# Patient Record
Sex: FEMALE | Race: WHITE | NOT HISPANIC OR LATINO | Employment: FULL TIME | URBAN - METROPOLITAN AREA
[De-identification: names, ages, dates, MRNs, and addresses within clinical notes are randomized per-mention and may not be internally consistent; named-entity substitution may affect disease eponyms.]

---

## 2017-12-01 ENCOUNTER — HOSPITAL ENCOUNTER (OUTPATIENT)
Facility: MEDICAL CENTER | Age: 32
End: 2017-12-01
Attending: SPECIALIST | Admitting: SPECIALIST
Payer: COMMERCIAL

## 2017-12-01 VITALS
SYSTOLIC BLOOD PRESSURE: 103 MMHG | BODY MASS INDEX: 21.9 KG/M2 | HEIGHT: 66 IN | OXYGEN SATURATION: 97 % | HEART RATE: 77 BPM | RESPIRATION RATE: 16 BRPM | TEMPERATURE: 97.5 F | WEIGHT: 136.24 LBS | DIASTOLIC BLOOD PRESSURE: 62 MMHG

## 2017-12-01 PROBLEM — Z41.1 ENCOUNTER FOR COSMETIC SURGERY: Status: ACTIVE | Noted: 2017-12-01

## 2017-12-01 LAB
B-HCG FREE SERPL-ACNC: <5 MIU/ML
IHCGL IHCGL: NEGATIVE MIU/ML

## 2017-12-01 PROCEDURE — 160048 HCHG OR STATISTICAL LEVEL 1-5: Performed by: SPECIALIST

## 2017-12-01 PROCEDURE — 160028 HCHG SURGERY MINUTES - 1ST 30 MINS LEVEL 3: Performed by: SPECIALIST

## 2017-12-01 PROCEDURE — 160039 HCHG SURGERY MINUTES - EA ADDL 1 MIN LEVEL 3: Performed by: SPECIALIST

## 2017-12-01 PROCEDURE — 500122 HCHG BOVIE, BLADE: Performed by: SPECIALIST

## 2017-12-01 PROCEDURE — A9270 NON-COVERED ITEM OR SERVICE: HCPCS

## 2017-12-01 PROCEDURE — 160035 HCHG PACU - 1ST 60 MINS PHASE I: Performed by: SPECIALIST

## 2017-12-01 PROCEDURE — 502240 HCHG MISC OR SUPPLY RC 0272: Performed by: SPECIALIST

## 2017-12-01 PROCEDURE — 84702 CHORIONIC GONADOTROPIN TEST: CPT

## 2017-12-01 PROCEDURE — 700102 HCHG RX REV CODE 250 W/ 637 OVERRIDE(OP)

## 2017-12-01 PROCEDURE — 700111 HCHG RX REV CODE 636 W/ 250 OVERRIDE (IP)

## 2017-12-01 PROCEDURE — 700105 HCHG RX REV CODE 258: Performed by: SPECIALIST

## 2017-12-01 PROCEDURE — 700101 HCHG RX REV CODE 250

## 2017-12-01 PROCEDURE — 160002 HCHG RECOVERY MINUTES (STAT): Performed by: SPECIALIST

## 2017-12-01 PROCEDURE — 160009 HCHG ANES TIME/MIN: Performed by: SPECIALIST

## 2017-12-01 PROCEDURE — 160025 RECOVERY II MINUTES (STATS): Performed by: SPECIALIST

## 2017-12-01 PROCEDURE — 502575 HCHG PACK, BREAST: Performed by: SPECIALIST

## 2017-12-01 PROCEDURE — 160036 HCHG PACU - EA ADDL 30 MINS PHASE I: Performed by: SPECIALIST

## 2017-12-01 PROCEDURE — 500562 HCHG FIBERWIRE: Performed by: SPECIALIST

## 2017-12-01 PROCEDURE — 160046 HCHG PACU - 1ST 60 MINS PHASE II: Performed by: SPECIALIST

## 2017-12-01 PROCEDURE — 501838 HCHG SUTURE GENERAL: Performed by: SPECIALIST

## 2017-12-01 DEVICE — IMPLANTABLE DEVICE: Type: IMPLANTABLE DEVICE | Site: BREAST | Status: FUNCTIONAL

## 2017-12-01 RX ORDER — ACETAMINOPHEN 500 MG
TABLET ORAL
Status: COMPLETED
Start: 2017-12-01 | End: 2017-12-01

## 2017-12-01 RX ORDER — OXYCODONE HCL 5 MG/5 ML
SOLUTION, ORAL ORAL
Status: COMPLETED
Start: 2017-12-01 | End: 2017-12-01

## 2017-12-01 RX ORDER — GENTAMICIN SULFATE 40 MG/ML
INJECTION, SOLUTION INTRAMUSCULAR; INTRAVENOUS
Status: DISCONTINUED | OUTPATIENT
Start: 2017-12-01 | End: 2017-12-01 | Stop reason: HOSPADM

## 2017-12-01 RX ORDER — BUPIVACAINE HYDROCHLORIDE AND EPINEPHRINE 2.5; 5 MG/ML; UG/ML
INJECTION, SOLUTION EPIDURAL; INFILTRATION; INTRACAUDAL; PERINEURAL
Status: DISCONTINUED | OUTPATIENT
Start: 2017-12-01 | End: 2017-12-01 | Stop reason: HOSPADM

## 2017-12-01 RX ORDER — PROMETHAZINE HYDROCHLORIDE 25 MG/1
TABLET ORAL
Status: COMPLETED
Start: 2017-12-01 | End: 2017-12-01

## 2017-12-01 RX ORDER — GABAPENTIN 300 MG/1
CAPSULE ORAL
Status: COMPLETED
Start: 2017-12-01 | End: 2017-12-01

## 2017-12-01 RX ORDER — OXYCODONE HCL 10 MG/1
TABLET, FILM COATED, EXTENDED RELEASE ORAL
Status: COMPLETED
Start: 2017-12-01 | End: 2017-12-01

## 2017-12-01 RX ORDER — SCOLOPAMINE TRANSDERMAL SYSTEM 1 MG/1
PATCH, EXTENDED RELEASE TRANSDERMAL
Status: COMPLETED
Start: 2017-12-01 | End: 2017-12-01

## 2017-12-01 RX ORDER — BACITRACIN ZINC 500 [USP'U]/G
OINTMENT TOPICAL
Status: DISCONTINUED | OUTPATIENT
Start: 2017-12-01 | End: 2017-12-01 | Stop reason: HOSPADM

## 2017-12-01 RX ORDER — LIDOCAINE HYDROCHLORIDE 10 MG/ML
INJECTION, SOLUTION INFILTRATION; PERINEURAL
Status: COMPLETED
Start: 2017-12-01 | End: 2017-12-01

## 2017-12-01 RX ORDER — BACITRACIN 50000 [IU]/1
INJECTION, POWDER, FOR SOLUTION INTRAMUSCULAR
Status: DISCONTINUED | OUTPATIENT
Start: 2017-12-01 | End: 2017-12-01 | Stop reason: HOSPADM

## 2017-12-01 RX ORDER — SODIUM CHLORIDE, SODIUM LACTATE, POTASSIUM CHLORIDE, CALCIUM CHLORIDE 600; 310; 30; 20 MG/100ML; MG/100ML; MG/100ML; MG/100ML
INJECTION, SOLUTION INTRAVENOUS
Status: DISCONTINUED | OUTPATIENT
Start: 2017-12-01 | End: 2017-12-01 | Stop reason: HOSPADM

## 2017-12-01 RX ORDER — CEFAZOLIN SODIUM 1 G/3ML
INJECTION, POWDER, FOR SOLUTION INTRAMUSCULAR; INTRAVENOUS
Status: DISCONTINUED | OUTPATIENT
Start: 2017-12-01 | End: 2017-12-01 | Stop reason: HOSPADM

## 2017-12-01 RX ORDER — MIDAZOLAM HYDROCHLORIDE 1 MG/ML
INJECTION INTRAMUSCULAR; INTRAVENOUS
Status: DISCONTINUED
Start: 2017-12-01 | End: 2017-12-01 | Stop reason: HOSPADM

## 2017-12-01 RX ADMIN — ACETAMINOPHEN 1000 MG: 500 TABLET, COATED ORAL at 12:36

## 2017-12-01 RX ADMIN — LIDOCAINE HYDROCHLORIDE: 10 INJECTION, SOLUTION INFILTRATION; PERINEURAL at 11:20

## 2017-12-01 RX ADMIN — SCOPALAMINE 1 PATCH: 1 PATCH, EXTENDED RELEASE TRANSDERMAL at 12:30

## 2017-12-01 RX ADMIN — OXYCODONE HYDROCHLORIDE 10 MG: 10 TABLET, FILM COATED, EXTENDED RELEASE ORAL at 12:36

## 2017-12-01 RX ADMIN — OXYCODONE HYDROCHLORIDE 5 MG: 5 SOLUTION ORAL at 17:49

## 2017-12-01 RX ADMIN — FENTANYL CITRATE 25 MCG: 50 INJECTION, SOLUTION INTRAMUSCULAR; INTRAVENOUS at 17:30

## 2017-12-01 RX ADMIN — PROMETHAZINE HYDROCHLORIDE 25 MG: 25 TABLET ORAL at 12:36

## 2017-12-01 RX ADMIN — SODIUM CHLORIDE, POTASSIUM CHLORIDE, SODIUM LACTATE AND CALCIUM CHLORIDE: 600; 310; 30; 20 INJECTION, SOLUTION INTRAVENOUS at 11:20

## 2017-12-01 RX ADMIN — FENTANYL CITRATE 25 MCG: 50 INJECTION, SOLUTION INTRAMUSCULAR; INTRAVENOUS at 17:49

## 2017-12-01 RX ADMIN — SODIUM CHLORIDE, POTASSIUM CHLORIDE, SODIUM LACTATE AND CALCIUM CHLORIDE: 600; 310; 30; 20 INJECTION, SOLUTION INTRAVENOUS at 17:05

## 2017-12-01 RX ADMIN — GABAPENTIN 300 MG: 300 CAPSULE ORAL at 12:36

## 2017-12-01 ASSESSMENT — PAIN SCALES - GENERAL
PAINLEVEL_OUTOF10: ASSUMED PAIN PRESENT
PAINLEVEL_OUTOF10: 7
PAINLEVEL_OUTOF10: 4
PAINLEVEL_OUTOF10: 4
PAINLEVEL_OUTOF10: ASSUMED PAIN PRESENT
PAINLEVEL_OUTOF10: 4
PAINLEVEL_OUTOF10: 4
PAINLEVEL_OUTOF10: ASSUMED PAIN PRESENT
PAINLEVEL_OUTOF10: 7
PAINLEVEL_OUTOF10: ASSUMED PAIN PRESENT
PAINLEVEL_OUTOF10: 5
PAINLEVEL_OUTOF10: 4
PAINLEVEL_OUTOF10: 0

## 2017-12-01 NOTE — OR NURSING
1029 PT TO PRE OP TO ASSUME CARE.  1132 Patient allergies and NPO status verified, home medication reconciliation completed and belongings secured. Patient verbalizes understanding of pain scale, expected course of stay and plan of care. Surgical site verified with patient. IV access established. Sequentials placed at bedside

## 2017-12-02 NOTE — OR NURSING
1705: To PACU from OR via gurney, sleeping, respirations spontaneous and non-labored via OPA. CDI bilateral breast surgical dressings.  1715: Still sleeping, respirations spontaneous and non-labored via OPA.   1723: Pt awake, able to open mouth and OPA removed, able to cough on request.  1730: Pain medication given per MAR, no nausea. Tolerating room air.  1745: More awake, asking a lot of questions. SO updated. Pain is tolerable, no nausea.  1800: Pain still not tolerable, pain medication given per MAR, no nausea, still tolerating room air.  1815: Pain is still tolerable, no nausea. Tolerating room air. Call placed to Dr Luis Manuel Casas for discharge order.   1820: Dr Casas returned call and gave orders.  1830: Pain is tolerable, no nausea, sleepy, but arouses to voice. Tolerating room air.  1945: Pain is still tolerable, no nausea. Meets criteria to transfer to Stage 2. Pt dressed and readied for transfer to Stage 2, will follow her into the discharge area.

## 2017-12-02 NOTE — OP REPORT
DATE OF SERVICE:  12/01/2017    PREOPERATIVE DIAGNOSES:    1.  Bilateral ptosis.  2.  Previous breast implants, now with bottoming out on the right side.  3.  Desire for larger implants.  4.  Asymmetry.    POSTOPERATIVE DIAGNOSES:  1.  Bilateral ptosis.  2.  Previous breast implants, now with bottoming out on the right side.  3.  Desire for larger implants.  4.  Asymmetry.    OPERATIVE PROCEDURE:  1.  Bilateral Benelli mastopexy.  2.  Bilateral internal lateral capsulorrhaphy.  3.  Bilateral medial superior capsulotomy.  4.  Placement of Allergan SRF gel implants 695 mL bilateral.    SURGEON:  Luis Manuel Casas MD    ANESTHESIOLOGIST:  Alfredo Webb MD.    ANESTHESIA:  General endotracheal tube.    COMPLICATIONS:  None.    ESTIMATED BLOOD LOSS:  100 mL    INDICATIONS:  The patient is a 32-year-old female who desires to switch out   her old implants.  She has had children since her implants and has ptosis   bilaterally.  Most of the ptosis is considered pseudoptosis but she has   bottoming out of the right implant and the patient would also like larger   implants to fill up the redundant skin.  Risk and benefits of the procedure   have been explained in full including asymmetries, nerve damage, scar   formation, need for further surgery, hematoma, seroma.  The patient   understands the risks and wished to proceed.    FINDINGS:  As above.    DESCRIPTION OF PROCEDURE:  The patient was preoperatively marked in the   holding room in standing position.  She was taken to operating theater where   general anesthesia was induced.  Ancef 2 g was given prior to starting the   procedure.  Initially, we started by doing the Benelli mastopexy, I used a   38-mm template around the nipple areolar complex.  I had already pre-drawn the   Benelli mastopexy and essentially I tried to cheat upwards on the right   nipple areolar complex, because it was significantly lower than the left, so   once we had drawn our areolar marks, I  deepithelialized the Benelli mastopexy   and then brought the outer ring down to the edge of the areola with 3-0 Vicryl   sutures in a clockwise fashion.  The 2-0 FiberWire was then sutured around   the outer ring in a pursestring fashion and pulled tightly to match the outer   ring to the edge of the areola.  Then, finally suture line of 3-0 Stratafix   was done in a subcuticular stitch to close the skin itself.  Both sides had a   very nice closure.  I was pleased with the Benelli mastopexy.  There was still   some height discrepancy between the right and left nipple areolar complexes.    I was hoping that I could make this up with the implants and the pocket   adjustments, because the right pocket had bottomed out so far, the new   implants and tightening the lateral and inferior pockets may even the height   of the nipple areolar complexes out and that is what I had hoped for.  Once I   had finished the Benelli mastopexy, I opened up the inframammary area on the   right breast, removed the old saline implant, which was 460 mL.  I then washed   the pocket with normal saline with triple antibiotic added, the lateral   pocket was obviously too far.  Scored it with the electrocautery.  Used a 2-0   Ethibond suture to retighten the lateral pocket, and because she had bottomed   out inferiorly also on this right side, I tightened up the lateral half of the   inferior portion of the pocket.  I then released the medial and superior   capsular tissue to allow the new implant better positioning.  I placed a   saline sizer, filled it to 695 mL and sat the patient up.  This definitely   helped fill up some of the skin that was remaining after the Benelli   mastopexies, but I was not totally satisfied with the shape.  I therefore had   my office bring over the SRF gel implants for a more high profile implant in   order to fit in the pocket better and elevate the nipple as much as possible.    Went over the left side, removed  the implant, which again was a 460 mL saline   implant, tightened the lateral pocket, opened up the medial and superior   pockets with electrocautery, and at this point, I chose the Allergan SRF gel   implants.  They were placed in the submuscular position through a Hester   funnel.  Patient was sat up and pocket adjustments were made to make sure that   the implant sat in the pocket evenly.  There were still some discrepancy   between the height of the nipple areolar complexes.  I released the capsular   tissue inferiorly on the right side, which helped bring the nipple up slightly   higher, tightened the pocket on the left side to lower the nipple areolar   complex on that side and both of those maneuvers helped, but I still was not   satisfied with the right nipple placement, so I took down the Benelli   mastopexy on the right side, cut out another centimeter elliptical incision   superiorly, redid the stitching with 3-0 Vicryl sutures and a 2-0 FiberWire   and then the 3-0 Stratafix.  This gave us as close as we could expect in   regards to the height of the nipples without too much breast distortion.  Once   I had closed that part, patient was sat up one final time.  The pockets were   irrigated, adjustments were made.  When we were satisfied, she was laid back   down, closed the inferior incisions with 3-0 Vicryl sutures in a multilayered   fashion and a 3-0 Stratafix suture in a subcuticular stitch.  Steri-Strips   were applied, 2-inch foam tape was placed around the breast and she was placed   in a comfort supportive bra.  I wrapped her breasts with a Shur-Band Ace wrap   for compression.  She was returned to the PACU in stable condition.       ____________________________________     MD BETZAIDA ARIZMENDI / BIBI    DD:  12/01/2017 17:42:55  DT:  12/02/2017 01:18:37    D#:  [id number]  Job#:  [id number]

## 2017-12-02 NOTE — OR NURSING
1846: Settled in recliner post short ambulation from [facility], tolerated it well. Pain is tolerable, no nausea. Dressing is still CDI.   1855: Boyfriend, Shahab, brought back to go over DC instruction.   1920: D/Uriel to care of family post uneventful stay in PACU 2.

## 2017-12-02 NOTE — DISCHARGE INSTRUCTIONS
ACTIVITY: Rest and take it easy for the first 24 hours.  A responsible adult is recommended to remain with you during that time.  It is normal to feel sleepy.  We encourage you to not do anything that requires balance, judgment or coordination.    MILD FLU-LIKE SYMPTOMS ARE NORMAL. YOU MAY EXPERIENCE GENERALIZED MUSCLE ACHES, THROAT IRRITATION, HEADACHE AND/OR SOME NAUSEA.    FOR 24 HOURS DO NOT:  Drive, operate machinery or run household appliances.  Drink beer or alcoholic beverages.   Make important decisions or sign legal documents.    SPECIAL INSTRUCTIONS:   Elevate head of bed 45 degrees x 48 hours (prop up on several pillows when resting or sleeping.   Leave ACE wrap on for 48 hours.   Keep dressing clean, dry and intact.   Leave steri-strips in place.   May shower in 48 hours, but leave foam tape on across cleavage for 5 days.   Wear surgical bra continually, may remove for shower only, until notified by MD.   Remove scopolamine patch after 72 hours; immediately wash skin then hands with soap and water.     Start antibiotics tomorrow (Saturday).      DIET: To avoid nausea, slowly advance diet as tolerated, avoiding spicy or greasy foods for the first day.  Add more substantial food to your diet according to your physician's instructions.  INCREASE FLUIDS AND FIBER TO AVOID CONSTIPATION.    SURGICAL DRESSING/BATHING: Keep dressing clean, dry and intact x 48 hours.     FOLLOW-UP APPOINTMENT:  A follow-up appointment should be arranged with your doctor in office as instructed. ; call to schedule.    You should CALL YOUR PHYSICIAN if you develop:  Fever greater than 101 degrees F.  Pain not relieved by medication, or persistent nausea or vomiting.  Excessive bleeding (blood soaking through dressing) or unexpected drainage from the wound.  Extreme redness or swelling around the incision site, drainage of pus or foul smelling drainage.  Inability to urinate or empty your bladder within 8 hours.  Problems with  breathing or chest pain.    You should call 911 if you develop problems with breathing or chest pain.  If you are unable to contact your doctor or surgical center, you should go to the nearest emergency room or urgent care center.  Dr Casas's telephone #: [phone]    If any questions arise, call your doctor.  If your doctor is not available, please feel free to call the Surgical Center at [phone].  The Center is open Monday through Friday from 7AM to 7PM.  You can also call the HEALTH HOTLINE open 24 hours/day, 7 days/week and speak to a nurse at [phone], or toll free at [phone].    A registered nurse may call you a few days after your surgery to see how you are doing after your procedure.    MEDICATIONS: Resume taking daily medication.  Take prescribed pain medication with food.  If no medication is prescribed, you may take non-aspirin pain medication if needed.  PAIN MEDICATION CAN BE VERY CONSTIPATING.  Take a stool softener or laxative such as senokot, pericolace, or milk of magnesia if needed.    Prescription given prior to surgery.  Last pain medication given at 5:49 PM.    If your physician has prescribed pain medication that includes Acetaminophen (Tylenol), do not take additional Acetaminophen (Tylenol) while taking the prescribed medication.    Depression / Suicide Risk    As you are discharged from this [facility] facility, it is important to learn how to keep safe from harming yourself.    Recognize the warning signs:  · Abrupt changes in personality, positive or negative- including increase in energy   · Giving away possessions  · Change in eating patterns- significant weight changes-  positive or negative  · Change in sleeping patterns- unable to sleep or sleeping all the time   · Unwillingness or inability to communicate  · Depression  · Unusual sadness, discouragement and loneliness  · Talk of wanting to die  · Neglect of personal appearance   · Rebelliousness- reckless  behavior  · Withdrawal from people/activities they love  · Confusion- inability to concentrate     If you or a loved one observes any of these behaviors or has concerns about self-harm, here's what you can do:  · Talk about it- your feelings and reasons for harming yourself  · Remove any means that you might use to hurt yourself (examples: pills, rope, extension cords, firearm)  · Get professional help from the community (Mental Health, Substance Abuse, psychological counseling)  · Do not be alone:Call your Safe Contact- someone whom you trust who will be there for you.  · Call your local CRISIS HOTLINE 753-3082 or 417-298-2800  · Call your local Children's Mobile Crisis Response Team [place] (498) 061-3077 or www.[employer]  · Call the toll free National Suicide Prevention Hotlines   · National Suicide Prevention Lifeline 165-431-IKWW (4148)  · National Hope Line Network 800-SUICIDE (397-6638)

## 2017-12-02 NOTE — OR SURGEON
Immediate Post OP Note    PreOp Diagnosis: desirer for larger implants, ptosis    PostOp Diagnosis: same    Procedure(s):  PLACEMENT BILATERAL BREAST IMPLANTS - Wound Class: Clean  BREAST IMPLANT REMOVAL - Wound Class: Clean  CAPSULECTOMY - Wound Class: Clean  MASTOPEXY- BENELLI - Wound Class: Clean    Surgeon(s):  Luis Manuel Casas M.D.    Anesthesiologist/Type of Anesthesia:  Anesthesiologist: Alfredo Webb M.D./General    Surgical Staff:  Circulator: Jyotsna Begum R.N.  Scrub Person: Tere Traore    Specimens:  * No specimens in log *    Estimated Blood Loss: 75cc    Findings:     Complications: none        12/1/2017 5:20 PM Luis Manuel Casas

## 2017-12-13 NOTE — OR NURSING
Reviewed patient's medical history with Dr. Feliz, based upon information available, Dr Feliz indicates that the patient is a candidate to have the procedure at [facility] with further recommendations

## 2017-12-14 ENCOUNTER — HOSPITAL ENCOUNTER (OUTPATIENT)
Facility: MEDICAL CENTER | Age: 32
End: 2017-12-14
Attending: SPECIALIST | Admitting: SPECIALIST
Payer: COMMERCIAL

## 2017-12-14 VITALS
OXYGEN SATURATION: 95 % | RESPIRATION RATE: 12 BRPM | WEIGHT: 141.31 LBS | BODY MASS INDEX: 22.81 KG/M2 | TEMPERATURE: 98.2 F | SYSTOLIC BLOOD PRESSURE: 111 MMHG | HEART RATE: 84 BPM | DIASTOLIC BLOOD PRESSURE: 63 MMHG

## 2017-12-14 LAB
B-HCG FREE SERPL-ACNC: <5 MIU/ML
IHCGL IHCGL: NEGATIVE MIU/ML

## 2017-12-14 PROCEDURE — 160046 HCHG PACU - 1ST 60 MINS PHASE II: Performed by: SPECIALIST

## 2017-12-14 PROCEDURE — 160048 HCHG OR STATISTICAL LEVEL 1-5: Performed by: SPECIALIST

## 2017-12-14 PROCEDURE — A9270 NON-COVERED ITEM OR SERVICE: HCPCS

## 2017-12-14 PROCEDURE — 160028 HCHG SURGERY MINUTES - 1ST 30 MINS LEVEL 3: Performed by: SPECIALIST

## 2017-12-14 PROCEDURE — 160002 HCHG RECOVERY MINUTES (STAT): Performed by: SPECIALIST

## 2017-12-14 PROCEDURE — 700111 HCHG RX REV CODE 636 W/ 250 OVERRIDE (IP)

## 2017-12-14 PROCEDURE — 302699 HCHG ABDOMINAL BINDER: Performed by: SPECIALIST

## 2017-12-14 PROCEDURE — 500423 HCHG DRESSING, ABD COMBINE: Performed by: SPECIALIST

## 2017-12-14 PROCEDURE — 160009 HCHG ANES TIME/MIN: Performed by: SPECIALIST

## 2017-12-14 PROCEDURE — 700101 HCHG RX REV CODE 250

## 2017-12-14 PROCEDURE — 500378 HCHG DRAIN, J-VAC ROUND 19FR: Performed by: SPECIALIST

## 2017-12-14 PROCEDURE — 160035 HCHG PACU - 1ST 60 MINS PHASE I: Performed by: SPECIALIST

## 2017-12-14 PROCEDURE — 700102 HCHG RX REV CODE 250 W/ 637 OVERRIDE(OP)

## 2017-12-14 PROCEDURE — 160036 HCHG PACU - EA ADDL 30 MINS PHASE I: Performed by: SPECIALIST

## 2017-12-14 PROCEDURE — 84702 CHORIONIC GONADOTROPIN TEST: CPT

## 2017-12-14 PROCEDURE — 500389 HCHG DRAIN, RESERVOIR SUCT JP 100CC: Performed by: SPECIALIST

## 2017-12-14 PROCEDURE — 502575 HCHG PACK, BREAST: Performed by: SPECIALIST

## 2017-12-14 PROCEDURE — A6223 GAUZE >16<=48 NO W/SAL W/O B: HCPCS | Performed by: SPECIALIST

## 2017-12-14 PROCEDURE — 501445 HCHG STAPLER, SKIN DISP: Performed by: SPECIALIST

## 2017-12-14 PROCEDURE — 501838 HCHG SUTURE GENERAL: Performed by: SPECIALIST

## 2017-12-14 PROCEDURE — A6402 STERILE GAUZE <= 16 SQ IN: HCPCS | Performed by: SPECIALIST

## 2017-12-14 PROCEDURE — 160039 HCHG SURGERY MINUTES - EA ADDL 1 MIN LEVEL 3: Performed by: SPECIALIST

## 2017-12-14 PROCEDURE — 160025 RECOVERY II MINUTES (STATS): Performed by: SPECIALIST

## 2017-12-14 PROCEDURE — 500064 HCHG BINDER, 4-PANEL MED/LG: Performed by: SPECIALIST

## 2017-12-14 RX ORDER — BUPIVACAINE HYDROCHLORIDE 2.5 MG/ML
INJECTION, SOLUTION EPIDURAL; INFILTRATION; INTRACAUDAL
Status: DISCONTINUED | OUTPATIENT
Start: 2017-12-14 | End: 2017-12-14 | Stop reason: HOSPADM

## 2017-12-14 RX ORDER — ACETAMINOPHEN 500 MG
TABLET ORAL
Status: COMPLETED
Start: 2017-12-14 | End: 2017-12-14

## 2017-12-14 RX ORDER — OXYCODONE HCL 5 MG/5 ML
SOLUTION, ORAL ORAL
Status: COMPLETED
Start: 2017-12-14 | End: 2017-12-14

## 2017-12-14 RX ORDER — OXYCODONE HCL 20 MG/1
TABLET, FILM COATED, EXTENDED RELEASE ORAL
Status: COMPLETED
Start: 2017-12-14 | End: 2017-12-14

## 2017-12-14 RX ORDER — SODIUM CHLORIDE, SODIUM LACTATE, POTASSIUM CHLORIDE, CALCIUM CHLORIDE 600; 310; 30; 20 MG/100ML; MG/100ML; MG/100ML; MG/100ML
1000 INJECTION, SOLUTION INTRAVENOUS
Status: COMPLETED | OUTPATIENT
Start: 2017-12-14 | End: 2017-12-14

## 2017-12-14 RX ORDER — PROMETHAZINE HYDROCHLORIDE 25 MG/1
TABLET ORAL
Status: COMPLETED
Start: 2017-12-14 | End: 2017-12-14

## 2017-12-14 RX ORDER — GABAPENTIN 300 MG/1
CAPSULE ORAL
Status: COMPLETED
Start: 2017-12-14 | End: 2017-12-14

## 2017-12-14 RX ORDER — MIDAZOLAM HYDROCHLORIDE 1 MG/ML
INJECTION INTRAMUSCULAR; INTRAVENOUS
Status: DISCONTINUED
Start: 2017-12-14 | End: 2017-12-14 | Stop reason: HOSPADM

## 2017-12-14 RX ORDER — BUPIVACAINE HYDROCHLORIDE AND EPINEPHRINE 2.5; 5 MG/ML; UG/ML
INJECTION, SOLUTION EPIDURAL; INFILTRATION; INTRACAUDAL; PERINEURAL
Status: DISCONTINUED | OUTPATIENT
Start: 2017-12-14 | End: 2017-12-14 | Stop reason: HOSPADM

## 2017-12-14 RX ORDER — SCOLOPAMINE TRANSDERMAL SYSTEM 1 MG/1
PATCH, EXTENDED RELEASE TRANSDERMAL
Status: COMPLETED
Start: 2017-12-14 | End: 2017-12-14

## 2017-12-14 RX ADMIN — GABAPENTIN 600 MG: 300 CAPSULE ORAL at 11:45

## 2017-12-14 RX ADMIN — FENTANYL CITRATE 25 MCG: 50 INJECTION, SOLUTION INTRAMUSCULAR; INTRAVENOUS at 15:55

## 2017-12-14 RX ADMIN — SODIUM CHLORIDE, SODIUM LACTATE, POTASSIUM CHLORIDE, CALCIUM CHLORIDE 1000 ML: 600; 310; 30; 20 INJECTION, SOLUTION INTRAVENOUS at 10:52

## 2017-12-14 RX ADMIN — FENTANYL CITRATE 25 MCG: 50 INJECTION, SOLUTION INTRAMUSCULAR; INTRAVENOUS at 15:45

## 2017-12-14 RX ADMIN — OXYCODONE HYDROCHLORIDE 20 MG: 20 TABLET, FILM COATED, EXTENDED RELEASE ORAL at 11:45

## 2017-12-14 RX ADMIN — PROMETHAZINE HYDROCHLORIDE 25 MG: 25 TABLET ORAL at 11:45

## 2017-12-14 RX ADMIN — ACETAMINOPHEN 1000 MG: 500 TABLET, COATED ORAL at 11:45

## 2017-12-14 RX ADMIN — OXYCODONE HYDROCHLORIDE 10 MG: 5 SOLUTION ORAL at 15:35

## 2017-12-14 RX ADMIN — SCOPALAMINE 1 PATCH: 1 PATCH, EXTENDED RELEASE TRANSDERMAL at 11:45

## 2017-12-14 RX ADMIN — FENTANYL CITRATE 50 MCG: 50 INJECTION, SOLUTION INTRAMUSCULAR; INTRAVENOUS at 15:40

## 2017-12-14 ASSESSMENT — PAIN SCALES - GENERAL
PAINLEVEL_OUTOF10: 8

## 2017-12-14 NOTE — OR NURSING
1515 To PACU from OR via gurney, respirations spontaneous and non-labored. Dressing to abdomen c/d/i. CORINA present from under right side of dressing. Pain pump present from under left side of dressing.   1526 OPA dc'd at this time. VSS. Pt drowsy but responds to verbal stimuli.   1535 Pt more awake at this time. C/O 8/10 pain. Plan to medicate. See MAR   1555 Pt increasingly sleepy. Do not plan to further medicate at this time. VSS.   1610 Pt sleeping. VSS   1625 No change   1640 Pt remains sleepy. VSS.   1655 No change  1700 Pt more awake at this time. Tolerating sips of water. VSS.   1715 No change  1730 Pt meets criteria to transfer to stage two at this time. Pt states pain is tolerable. Pt denies nausea   1740 This RN to discharge pt from stage two   1745 Pt up to chair with help of RN. VSS   1815 Explained dc instructions to pt and pts SO. Both express understanding.   1820 Pt to bathroom at this time. Pt able to void.   1825 Pt meets criteria to be discharge home after stay in stage two.

## 2017-12-14 NOTE — OR SURGEON
Immediate Post OP Note    PreOp Diagnosis: abdominal laxity    PostOp Diagnosis: same    Procedure(s):  ABDOMINOPLASTY - Wound Class: Clean    Surgeon(s):  Luis Manuel Casas M.D.    Anesthesiologist/Type of Anesthesia:  Anesthesiologist: Alfredo Webb M.D./General    Surgical Staff:  Circulator: Laurel Ortiz R.N.  Relief Circulator: Rosa Pollack R.N.  Scrub Person: Tere Traore    Specimens:  * No specimens in log *    Estimated Blood Loss: 100cc    Findings:     Complications: none        12/14/2017 3:20 PM Luis Manuel Casas

## 2017-12-15 NOTE — DISCHARGE INSTRUCTIONS
ACTIVITY: Rest and take it easy for the first 24 hours.  A responsible adult is recommended to remain with you during that time.  It is normal to feel sleepy.  We encourage you to not do anything that requires balance, judgment or coordination.    MILD FLU-LIKE SYMPTOMS ARE NORMAL. YOU MAY EXPERIENCE GENERALIZED MUSCLE ACHES, THROAT IRRITATION, HEADACHE AND/OR SOME NAUSEA.    FOR 24 HOURS DO NOT:  Drive, operate machinery or run household appliances.  Drink beer or alcoholic beverages.   Make important decisions or sign legal documents.    SPECIAL INSTRUCTIONS: Remove Scopolamine patch from behind your ear in 72 hours - wash hands thoroughly immediately afterward and avoid touching your eyes after touching the medication patch when removing.  Sleep/rest with head elevated at least 30 degrees (ie well propped up with pillows in bed or in recliner chair)  May not shower with drains present. Begin taking antibiotic tomorrow.       DIET: To avoid nausea, slowly advance diet as tolerated, avoiding spicy or greasy foods for the first day.  Add more substantial food to your diet according to your physician's instructions. INCREASE FLUIDS AND FIBER TO AVOID CONSTIPATION.        FOLLOW-UP APPOINTMENT:  A follow-up appointment should be arranged with your doctor; call to schedule.    You should CALL YOUR PHYSICIAN if you develop:  Fever greater than 101 degrees F.  Pain not relieved by medication, or persistent nausea or vomiting.  Excessive bleeding (blood soaking through dressing) or unexpected drainage from the wound.  Extreme redness or swelling around the incision site, drainage of pus or foul smelling drainage.  Inability to urinate or empty your bladder within 8 hours.  Problems with breathing or chest pain.    You should call 911 if you develop problems with breathing or chest pain.  If you are unable to contact your doctor or surgical center, you should go to the nearest emergency room or urgent care center.    Augusto's telephone #: [phone]    If any questions arise, call your doctor.  If your doctor is not available, please feel free to call the Surgical Center at [phone].  The Center is open Monday through Friday from 7AM to 7PM.  You can also call the HEALTH HOTLINE open 24 hours/day, 7 days/week and speak to a nurse at [phone], or toll free at [phone].    A registered nurse may call you a few days after your surgery to see how you are doing after your procedure.    MEDICATIONS: Resume taking daily medication.  Take prescribed pain medication with food.  If no medication is prescribed, you may take non-aspirin pain medication if needed.  PAIN MEDICATION CAN BE VERY CONSTIPATING.  Take a stool softener or laxative such as senokot, pericolace, or milk of magnesia if needed.    Prescription given for Preoperatively .  Last pain medication given at 3:35 10 mg oxycodone .    If your physician has prescribed pain medication that includes Acetaminophen (Tylenol), do not take additional Acetaminophen (Tylenol) while taking the prescribed medication.    Depression / Suicide Risk    As you are discharged from this [facility] facility, it is important to learn how to keep safe from harming yourself.    Recognize the warning signs:  · Abrupt changes in personality, positive or negative- including increase in energy   · Giving away possessions  · Change in eating patterns- significant weight changes-  positive or negative  · Change in sleeping patterns- unable to sleep or sleeping all the time   · Unwillingness or inability to communicate  · Depression  · Unusual sadness, discouragement and loneliness  · Talk of wanting to die  · Neglect of personal appearance   · Rebelliousness- reckless behavior  · Withdrawal from people/activities they love  · Confusion- inability to concentrate     If you or a loved one observes any of these behaviors or has concerns about self-harm, here's what you can do:  · Talk about  it- your feelings and reasons for harming yourself  · Remove any means that you might use to hurt yourself (examples: pills, rope, extension cords, firearm)  · Get professional help from the community (Mental Health, Substance Abuse, psychological counseling)  · Do not be alone:Call your Safe Contact- someone whom you trust who will be there for you.  · Call your local CRISIS HOTLINE 725-6768 or 691-260-4944  · Call your local Children's Mobile Crisis Response Team [place] (518) 401-5427 or www.[employer]  · Call the toll free National Suicide Prevention Hotlines   · National Suicide Prevention Lifeline 330-465-YEED (4408)  · National Hope Line Network 800-SUICIDE (859-6637)

## 2017-12-19 NOTE — OP REPORT
DATE OF SERVICE:  12/14/2017    PREOPERATIVE DIAGNOSIS:  Abdominal laxity and abdominal striae.    POSTOPERATIVE DIAGNOSIS:  Abdominal laxity and abdominal striae.    OPERATIVE PROCEDURE:  Abdominoplasty.    SURGEON:  Luis Manuel Casas MD    ANESTHESIOLOGIST:  Alfredo Webb MD    ANESTHESIA:  General endotracheal tube.    COMPLICATIONS:  None.    ESTIMATED BLOOD LOSS:  Less than 150 mL.    INDICATIONS:  The patient is a 32-year-old female who desires an   abdominoplasty.  She has had 4 children.  She is actually very small, but has   abdominal striae and rectus diastasis.  The patient is here for   abdominoplasty.  Risks and benefits have been explained to the patient.  She   understands and wished to proceed.    FINDINGS:  As above.    DESCRIPTION OF PROCEDURE:  The patient was preoperatively marked in the   holding room in standing position.  She was taken to operating theater where   general anesthesia was induced.  2 g of Ancef was given prior to starting the   procedure.  Initially started with a lower curvilinear incision in a standard   abdominoplasty fashion dissecting down into the subcutaneous tissue.  I then   used electrocautery to dissect down to the anterior rectus fascia, dissected   up along the  anterior rectus fascia until I encountered the umbilicus.  I   made an incision around the umbilicus leaving fatty tissue on the umbilical   stalk to ensure blood supply and continued the dissection up to the xiphoid   process and rib margins.  Once we had completed that, I injected 50 mL of   0.25% Marcaine with epinephrine into the rectus muscle for postoperative pain   control.  We irrigated the abdomen with normal saline solution.  I performed a   rectus plication using 0 looped Ethibond suture from the xiphoid down to the   umbilicus and from the umbilicus down the suprapubic area.  Once that was   completed, I placed 2 pain catheters up along the rectus plication up to the   top of the xiphoid process  and then placed a #19 Bard drain on the right side   for postoperative drainage.  I pulled the abdominal flap down plicating  with   0 Vicryl sutures along the anterior rectus fascia to speed the adherence of   the abdominal flap.  Once that was completed, we pulled the redundant skin   down and excised it.  The patient obviously has very small, only 205 g of   tissue was removed from the right side, similarly 280 g was removed from the   left side.  Once both flaps'  tissues were removed, I proceeded with closing   the abdominal incision using 0 Vicryl sutures in Petey's fascia.  This was   done along the lower edge and then we closed the deep dermal layer with 2-0   Vicryl sutures, eventually closing the skin with 2-0 Stratafix suture in a   subcuticular fashion.  I needed a V-type incision over the umbilicus,   dissected down, identified the umbilicus brought it up through the new   umbilicoplasty hole and sutured in place with 3-0 Vicryl sutures around the   skin edge.  I used 4-0 Prolene interrupted sutures.  Drains were tied in.    Steri-Strips were applied to the incision and she was placed in abdominal   binder, ABD padding.  The patient was returned to the PACU in stable   condition.  She tolerated the procedure well.       ____________________________________     MD BETZAIDA ARIZMENDI / BIBI    DD:  12/18/2017 18:57:57  DT:  12/18/2017 20:38:27    D#:  [id number]  Job#:  [id number]

## (undated) DEVICE — HUMID-VENT HEAT AND MOISTURE EXCHANGE- (50/BX)

## (undated) DEVICE — SUTURE 0 ETHIBOND TP-1 84 LOOPED MUST ORDER A MIN OF 12 BOXES (12PK/BX)"

## (undated) DEVICE — PACK BREAST SM OR - (1EA/CA)

## (undated) DEVICE — SUCTION INSTRUMENT YANKAUER BULBOUS TIP W/O VENT (50EA/CA)

## (undated) DEVICE — ADHESIVE MASTISOL - (48/BX)

## (undated) DEVICE — SUTURE 2-0 VICRYL PLUS CT-1 36 (36PK/BX)"

## (undated) DEVICE — SPONGE GAUZESTER 4 X 4 4PLY - (128PK/CA)

## (undated) DEVICE — SENSOR SPO2 NEO LNCS ADHESIVE (20/BX) SEE USER NOTES

## (undated) DEVICE — ELECTRODE DUAL RETURN W/ CORD - (50/PK)

## (undated) DEVICE — SUTURE GENERAL

## (undated) DEVICE — SUTURE 3-0 VICRYL PLUSPS-2 - 18 INCH (36/BX)

## (undated) DEVICE — GOWN SURGICAL XX-LARGE - (28EA/CA) SIRUS NON REINFORCED

## (undated) DEVICE — GLOVE, LITE (PAIR)

## (undated) DEVICE — IMPLANT BREAST MP PLUS SIZER 650CC

## (undated) DEVICE — GLOVE BIOGEL PI ULTRATOUCH SZ 7.5 SURGICAL PF LF -(50/BX 4BX/CA)

## (undated) DEVICE — BAG, SPONGE COUNT 50600

## (undated) DEVICE — PROTECTOR ULNA NERVE - (36PR/CA)

## (undated) DEVICE — SUTURE 2-0 ETHIBOND GREEN CT-2 TAPER (36PK/BX)

## (undated) DEVICE — SUTURE 0 VICRYL PLUS CT-1 - 36 INCH (36/BX)

## (undated) DEVICE — MASK ANESTHESIA ADULT  - (100/CA)

## (undated) DEVICE — LACTATED RINGERS INJ 1000 ML - (14EA/CA 60CA/PF)

## (undated) DEVICE — SODIUM CHL IRRIGATION 0.9% 1000ML (12EA/CA)

## (undated) DEVICE — SUT NABSB 4-0 P-3 18IN PRLN - (12/BX)

## (undated) DEVICE — SUTURE 2-0 VICRYL PLUS CT-2 - 27 INCH (36/BX)

## (undated) DEVICE — GLOVE BIOGEL PI ULTRATOUCH SZ 7.0 SURGICAL PF LF- POWDER FREE (50/BX 4BX/CA)

## (undated) DEVICE — TUBE CONNECTING SUCTION - CLEAR PLASTIC STERILE 72 IN (50EA/CA)

## (undated) DEVICE — TRAY SKIN SCRUB PVP WET (20EA/CA) PART #DYND70356 DISCONTINUED

## (undated) DEVICE — SUTURE 3-0 30CM X 30CM STRATAFIX SPIRAL PGA/PLC CLR FS NEEDLE (12/BX)

## (undated) DEVICE — BINDER ABDOMINAL 24X30X12 IN - FITS 24-30IN WAIST 12IN HIGH

## (undated) DEVICE — STAPLER SKIN DISP - (6/BX 10BX/CA) VISISTAT

## (undated) DEVICE — GLOVE BIOGEL SZ 8.5 SURGICAL PF LTX - (50PR/BX 4BX/CA)

## (undated) DEVICE — BOVIE BLADE COATED &INSULATED (50EA/PK)

## (undated) DEVICE — GOWN WARMING STANDARD FLEX - (30/CA)

## (undated) DEVICE — NEPTUNE 4 PORT MANIFOLD - (20/PK)

## (undated) DEVICE — WATER IRRIGATION STERILE 1000ML (12EA/CA)

## (undated) DEVICE — DRAPE SURGICAL U 77X120 - (10/CA)

## (undated) DEVICE — SYRINGE 30 ML LL (56/BX)

## (undated) DEVICE — RESERVOIR SUCTION 100 CC - SILICONE (20EA/CA)

## (undated) DEVICE — DRAIN J-VAC 19FR. ROUND - (10/CS)

## (undated) DEVICE — MARKER SKIN INTERNAL PRESSURIZED

## (undated) DEVICE — ELECTRODE 850 FOAM ADHESIVE - HYDROGEL RADIOTRNSPRNT (50/PK)

## (undated) DEVICE — SUTURE 3-0 PROLENE PS-1 (12PK/BX)

## (undated) DEVICE — KIT ROOM DECONTAMINATION

## (undated) DEVICE — HEAD HOLDER JUNIOR/ADULT

## (undated) DEVICE — TOWELS CLOTH SURGICAL - (4/PK 20PK/CA)

## (undated) DEVICE — PEN SKIN MARKER W/RULER - (50EA/BX)

## (undated) DEVICE — FIBERWIRE 4-0 - (12/BX)

## (undated) DEVICE — GLOVE BIOGEL SZ 7 SURGICAL PF LTX - (50PR/BX 4BX/CA)

## (undated) DEVICE — SPONGE DRAIN 4 X 4IN 6-PLY - (2/PK25PK/BX12BX/CS)

## (undated) DEVICE — FIBERWIRE 2-0 - 12/BX

## (undated) DEVICE — BINDER ABDOMINAL 45-62 INCH - 4 PANEL 12 IN (1/EA)

## (undated) DEVICE — BANDAGE ELASTIC DOUBLE 6X10 - (6EA/BX)"

## (undated) DEVICE — NEEDLE NON SAFETY HYPO 22 GA X 1 1/2 IN (100/BX)

## (undated) DEVICE — CLOSURE SKIN STRIP 1/2 X 4 IN - (STERI STRIP) (50/BX 4BX/CA)

## (undated) DEVICE — SUTURE 4-0 30CM X 30CM STRATAFIX SPRIAL PGA/PCL CLR FS-2 NEEDLE (12/BX)

## (undated) DEVICE — SUTURE 3-0 VICRYL PLUS SH - 27 INCH (36/BX)

## (undated) DEVICE — DRESSING ABDOMINAL PAD STERILE 8 X 10" (360EA/CA)"

## (undated) DEVICE — DRESSING PETROLEUM GAUZE 5 X 9" (50EA/BX 4BX/CA)"

## (undated) DEVICE — SUTURE 4-0 PROLENE PS-2 18 (36PK/BX)"

## (undated) DEVICE — KIT ANESTHESIA W/CIRCUIT & 3/LT BAG W/FILTER (20EA/CA)

## (undated) DEVICE — MASK, LARYNGEAL AIRWAY #4

## (undated) DEVICE — SOD. CHL. INJ. 0.9% 1000 ML - (14EA/CA 60CA/PF)

## (undated) DEVICE — PAD LAP STERILE 18 X 18 - (5/PK 40PK/CA)

## (undated) DEVICE — CANISTER SUCTION RIGID RED 1500CC (40EA/CA)